# Patient Record
Sex: MALE | Race: WHITE | NOT HISPANIC OR LATINO | Employment: FULL TIME | URBAN - METROPOLITAN AREA
[De-identification: names, ages, dates, MRNs, and addresses within clinical notes are randomized per-mention and may not be internally consistent; named-entity substitution may affect disease eponyms.]

---

## 2017-05-02 ENCOUNTER — TELEPHONE (OUTPATIENT)
Dept: CARDIOLOGY | Facility: CLINIC | Age: 42
End: 2017-05-02

## 2017-05-02 ENCOUNTER — OFFICE VISIT (OUTPATIENT)
Dept: CARDIOLOGY | Facility: CLINIC | Age: 42
End: 2017-05-02

## 2017-05-02 VITALS
BODY MASS INDEX: 40.58 KG/M2 | SYSTOLIC BLOOD PRESSURE: 130 MMHG | HEART RATE: 119 BPM | WEIGHT: 306.2 LBS | DIASTOLIC BLOOD PRESSURE: 80 MMHG | HEIGHT: 73 IN

## 2017-05-02 DIAGNOSIS — E66.9 OBESITY, UNSPECIFIED OBESITY SEVERITY, UNSPECIFIED OBESITY TYPE: ICD-10-CM

## 2017-05-02 DIAGNOSIS — R00.0 TACHYCARDIA: ICD-10-CM

## 2017-05-02 DIAGNOSIS — I10 ESSENTIAL HYPERTENSION: ICD-10-CM

## 2017-05-02 DIAGNOSIS — G47.33 OBSTRUCTIVE SLEEP APNEA SYNDROME: ICD-10-CM

## 2017-05-02 DIAGNOSIS — I48.91 ATRIAL FIBRILLATION, UNSPECIFIED TYPE (HCC): Primary | ICD-10-CM

## 2017-05-02 PROBLEM — F98.8 ATTENTION DEFICIT DISORDER WITHOUT HYPERACTIVITY: Status: ACTIVE | Noted: 2017-05-02

## 2017-05-02 PROCEDURE — 93000 ELECTROCARDIOGRAM COMPLETE: CPT | Performed by: NURSE PRACTITIONER

## 2017-05-02 PROCEDURE — 99204 OFFICE O/P NEW MOD 45 MIN: CPT | Performed by: NURSE PRACTITIONER

## 2017-05-02 RX ORDER — IBUPROFEN 600 MG/1
600 TABLET ORAL EVERY 6 HOURS PRN
COMMUNITY
End: 2017-05-02

## 2017-06-13 ENCOUNTER — OFFICE VISIT (OUTPATIENT)
Dept: CARDIOLOGY | Facility: CLINIC | Age: 42
End: 2017-06-13

## 2017-06-13 VITALS
HEIGHT: 73 IN | DIASTOLIC BLOOD PRESSURE: 92 MMHG | HEART RATE: 106 BPM | WEIGHT: 299 LBS | BODY MASS INDEX: 39.63 KG/M2 | RESPIRATION RATE: 18 BRPM | SYSTOLIC BLOOD PRESSURE: 156 MMHG

## 2017-06-13 DIAGNOSIS — R00.0 TACHYCARDIA: ICD-10-CM

## 2017-06-13 DIAGNOSIS — I10 ESSENTIAL HYPERTENSION: ICD-10-CM

## 2017-06-13 DIAGNOSIS — I48.91 ATRIAL FIBRILLATION, UNSPECIFIED TYPE (HCC): ICD-10-CM

## 2017-06-13 PROCEDURE — 99213 OFFICE O/P EST LOW 20 MIN: CPT | Performed by: INTERNAL MEDICINE

## 2017-06-13 PROCEDURE — 93000 ELECTROCARDIOGRAM COMPLETE: CPT | Performed by: INTERNAL MEDICINE

## 2017-06-21 NOTE — PROGRESS NOTES
Subjective:     Encounter Date:06/13/2017      Patient ID: Michael Talbot is a 42 y.o. male.    Chief Complaint:  Atrial Fibrillation   Presents for follow-up visit. Symptoms include palpitations and shortness of breath. Symptoms are negative for bradycardia, chest pain, dizziness, hypertension and syncope. The symptoms have been stable. Past medical history includes atrial fibrillation.       Patient presents today for reevaluation.  Patient is doing somewhat better.  His heart rate is better controlled.  [facility]  He still has no health insurance and still continues to work on obtaining some.  From a cardiovascular standpoint he says just occasional palpitations little bit of shortness of breath    Review of Systems   Cardiovascular: Positive for palpitations. Negative for chest pain and syncope.   Respiratory: Positive for shortness of breath.    Neurological: Negative for dizziness.   All other systems reviewed and are negative.        ECG 12 Lead  Date/Time: 6/13/2017 1:26 PM  Performed by: SHILOH GOLDMAN  Authorized by: SHILOH GOLDMAN   Comparison: compared with previous ECG from 5/2/2017  Similar to previous ECG  Rhythm: atrial fibrillation  Clinical impression: abnormal ECG               Objective:     Physical Exam   Constitutional: He is oriented to person, place, and time. He appears well-developed.   HENT:   Head: Normocephalic.   Eyes: Conjunctivae are normal.   Neck: Normal range of motion.   Cardiovascular: Normal rate and normal heart sounds.  An irregularly irregular rhythm present.   Pulmonary/Chest: Breath sounds normal.   Abdominal: Soft. Bowel sounds are normal.   Musculoskeletal: Normal range of motion. He exhibits no edema.   Neurological: He is alert and oriented to person, place, and time.   Skin: Skin is warm and dry.   Psychiatric: He has a normal mood and affect. His behavior is normal.   Vitals reviewed.      Lab Review:        Assessment:          Diagnosis Plan   1. Atrial fibrillation, unspecified type     2. Tachycardia     3. Essential hypertension            Plan:       1.  Atrial fibrillation.  He remains atrial fibrillation.  Symptomatically is doing better.  Obviously would be nice to have an echocardiogram which patient says he cannot afford the current time.  We did explain options for patient to look into one of these included have a heart foundation.  At this point I would not change anything his heart rate is better.  We'll try to follow-up in 4-6 weeks unless he finds a cardiologist.  Once again we discussed anticoagulation.  Due to financial constraints were going to continue the current approach per patient's request    Atrial Fibrillation and Atrial Flutter  Assessment  • The patient has atrial fibrillation-initial episode  • The patient's CHADS2-VASc score is 1  • A [id number] score of 1 is considered an intermediate risk for a thromboembolic event    Plan  • Attempt to maintain sinus rhythm  • Continue aspirin for antithrombotic therapy, bleeding issues discussed  • Add beta blocker for rhythm control

## 2017-07-25 ENCOUNTER — OFFICE VISIT (OUTPATIENT)
Dept: CARDIOLOGY | Facility: CLINIC | Age: 42
End: 2017-07-25

## 2017-07-25 VITALS
WEIGHT: 305 LBS | DIASTOLIC BLOOD PRESSURE: 86 MMHG | HEART RATE: 85 BPM | BODY MASS INDEX: 40.42 KG/M2 | SYSTOLIC BLOOD PRESSURE: 150 MMHG | HEIGHT: 73 IN

## 2017-07-25 DIAGNOSIS — I10 ESSENTIAL HYPERTENSION: ICD-10-CM

## 2017-07-25 DIAGNOSIS — R00.0 TACHYCARDIA: ICD-10-CM

## 2017-07-25 DIAGNOSIS — I48.91 ATRIAL FIBRILLATION, UNSPECIFIED TYPE (HCC): ICD-10-CM

## 2017-07-25 DIAGNOSIS — G47.33 OBSTRUCTIVE SLEEP APNEA SYNDROME: Primary | ICD-10-CM

## 2017-07-25 PROCEDURE — 93000 ELECTROCARDIOGRAM COMPLETE: CPT | Performed by: INTERNAL MEDICINE

## 2017-07-25 PROCEDURE — 99213 OFFICE O/P EST LOW 20 MIN: CPT | Performed by: INTERNAL MEDICINE

## 2017-07-25 RX ORDER — METOPROLOL TARTRATE 75 MG/1
75 TABLET, FILM COATED ORAL 2 TIMES DAILY
Qty: 60 TABLET | Refills: 11 | Status: SHIPPED | OUTPATIENT
Start: 2017-07-25 | End: 2017-09-13 | Stop reason: SDUPTHER

## 2017-07-30 NOTE — PROGRESS NOTES
Subjective:     Encounter Date:07/25/2017      Patient ID: Michael Talbot is a 42 y.o. male.    Chief Complaint:  Atrial Fibrillation   Presents for follow-up visit. Symptoms include chest pain, dizziness, hypertension, palpitations, tachycardia and weakness. Symptoms are negative for bradycardia, shortness of breath and syncope. Past medical history includes atrial fibrillation.   Hypertension   This is a chronic problem. The current episode started more than 1 year ago. The problem is resistant. Associated symptoms include chest pain and palpitations. Pertinent negatives include no anxiety, peripheral edema or shortness of breath.       42-year-old gentleman who presents today for reevaluation.  He continues to have palpitations on a daily basis occasional fatigue occasional lightheadedness as well as intermittent chest discomfort.  He was seen today for further evaluation    Review of Systems   Constitution: Positive for weakness.   Cardiovascular: Positive for chest pain and palpitations. Negative for syncope.   Respiratory: Negative for shortness of breath.    Neurological: Positive for dizziness.   All other systems reviewed and are negative.        ECG 12 Lead  Date/Time: 7/25/2017 11:05 AM  Performed by: SHILOH GOLDMAN  Authorized by: SHILOH GOLDMAN   Comparison: compared with previous ECG from 6/13/2017  Similar to previous ECG  Rhythm: atrial fibrillation  Clinical impression: abnormal ECG               Objective:     Physical Exam   Constitutional: He is oriented to person, place, and time. He appears well-developed.   HENT:   Head: Normocephalic.   Eyes: Conjunctivae are normal.   Neck: Normal range of motion.   Cardiovascular: Normal rate and normal heart sounds.  An irregularly irregular rhythm present.   Pulmonary/Chest: Breath sounds normal.   Abdominal: Soft. Bowel sounds are normal.   Musculoskeletal: Normal range of motion. He exhibits no edema.   Neurological: He is alert and oriented  to person, place, and time.   Skin: Skin is warm and dry.   Psychiatric: He has a normal mood and affect. His behavior is normal.   Vitals reviewed.      Lab Review:       Assessment:          Diagnosis Plan   1. Obstructive sleep apnea syndrome  Ambulatory Referral to Sleep Medicine   2. Atrial fibrillation, unspecified type  metoprolol tartrate 75 MG tablet   3. Tachycardia  metoprolol tartrate 75 MG tablet   4. Essential hypertension  metoprolol tartrate 75 MG tablet          Plan:       1.  History of obstructive sleep apnea.  I would have him follow-up with sleep clinic obviously this is a concern and probably the root of a lot of the issues.  2.  Can increase his Toprol to control his heart rate treated his blood pressure.  Have him follow back up in 6 weeks for reassessment.  I still think if we can get his sleep apnea treated singly he has a this would help a lot of his issues.  Atrial Fibrillation and Atrial Flutter  Assessment  • The patient has atrial fibrillation-initial episode  • The patient's CHADS2-VASc score is 1  • A [id number] score of 1 is considered an intermediate risk for a thromboembolic event    Plan  • Attempt to maintain sinus rhythm  • Continue aspirin for antithrombotic therapy, bleeding issues discussed  • Add beta blocker for rhythm control

## 2017-09-05 ENCOUNTER — APPOINTMENT (OUTPATIENT)
Dept: SLEEP MEDICINE | Facility: HOSPITAL | Age: 42
End: 2017-09-05
Attending: INTERNAL MEDICINE

## 2017-09-13 ENCOUNTER — OFFICE VISIT (OUTPATIENT)
Dept: CARDIOLOGY | Facility: CLINIC | Age: 42
End: 2017-09-13

## 2017-09-13 VITALS
DIASTOLIC BLOOD PRESSURE: 100 MMHG | WEIGHT: 307 LBS | HEART RATE: 82 BPM | HEIGHT: 73 IN | BODY MASS INDEX: 40.69 KG/M2 | OXYGEN SATURATION: 97 % | SYSTOLIC BLOOD PRESSURE: 138 MMHG

## 2017-09-13 DIAGNOSIS — I10 ESSENTIAL HYPERTENSION: ICD-10-CM

## 2017-09-13 DIAGNOSIS — I48.91 ATRIAL FIBRILLATION, UNSPECIFIED TYPE (HCC): ICD-10-CM

## 2017-09-13 DIAGNOSIS — R00.0 TACHYCARDIA: ICD-10-CM

## 2017-09-13 PROCEDURE — 99213 OFFICE O/P EST LOW 20 MIN: CPT | Performed by: INTERNAL MEDICINE

## 2017-09-13 RX ORDER — METOPROLOL TARTRATE 75 MG/1
75 TABLET, FILM COATED ORAL 2 TIMES DAILY
Qty: 180 TABLET | Refills: 3 | Status: SHIPPED | OUTPATIENT
Start: 2017-09-13

## 2017-09-22 NOTE — PROGRESS NOTES
Subjective:     Encounter Date:09/13/2017      Patient ID: Michael Talbot is a 42 y.o. male.    Chief Complaint:  Sleep Apnea   Associated symptoms include chest pain and weakness.   Atrial Fibrillation   Presents for follow-up visit. Symptoms include chest pain, dizziness, hypertension, palpitations, tachycardia and weakness. Symptoms are negative for bradycardia, shortness of breath and syncope. Past medical history includes atrial fibrillation.   Hypertension   This is a chronic problem. The current episode started more than 1 year ago. The problem is resistant. Associated symptoms include chest pain and palpitations. Pertinent negatives include no anxiety, peripheral edema or shortness of breath. Identifiable causes of hypertension include sleep apnea.       Patient presents today for reevaluation.  A little short of breath little bit of fatigue but all in all he is better.    Review of Systems   Constitution: Positive for weakness.   Cardiovascular: Positive for chest pain and palpitations. Negative for syncope.   Respiratory: Negative for shortness of breath.    Neurological: Positive for dizziness.   All other systems reviewed and are negative.      Procedures       Objective:     Physical Exam   Constitutional: He is oriented to person, place, and time. He appears well-developed.   HENT:   Head: Normocephalic.   Eyes: Conjunctivae are normal.   Neck: Normal range of motion.   Cardiovascular: Normal rate, regular rhythm and normal heart sounds.    Pulmonary/Chest: Breath sounds normal.   Abdominal: Soft. Bowel sounds are normal.   Musculoskeletal: Normal range of motion. He exhibits no edema.   Neurological: He is alert and oriented to person, place, and time.   Skin: Skin is warm and dry.   Psychiatric: He has a normal mood and affect. His behavior is normal.   Vitals reviewed.      Lab Review:       Assessment:          Diagnosis Plan   1. Atrial fibrillation, unspecified type  Metoprolol Tartrate 75 MG  tablet   2. Tachycardia  Metoprolol Tartrate 75 MG tablet   3. Essential hypertension  Metoprolol Tartrate 75 MG tablet          Plan:       1.  Hypertension.  Overall blood pressure better diastolic is still little on the high side reinforced decreased salt intake.  2.  Paroxysmal atrial fibrillation clinically doing well no further episodes  3.  Obstructive sleep apnea  4.  Follow-up 3 months.